# Patient Record
Sex: MALE | Race: WHITE | NOT HISPANIC OR LATINO | Employment: FULL TIME | URBAN - NONMETROPOLITAN AREA
[De-identification: names, ages, dates, MRNs, and addresses within clinical notes are randomized per-mention and may not be internally consistent; named-entity substitution may affect disease eponyms.]

---

## 2017-06-14 ENCOUNTER — HOSPITAL ENCOUNTER (EMERGENCY)
Facility: HOSPITAL | Age: 32
Discharge: HOME | End: 2017-06-15
Attending: EMERGENCY MEDICINE | Admitting: EMERGENCY MEDICINE

## 2017-06-14 DIAGNOSIS — K92.2 LOWER GI BLEEDING: Primary | ICD-10-CM

## 2017-06-14 PROCEDURE — 99282 EMERGENCY DEPT VISIT SF MDM: CPT

## 2017-06-15 VITALS
HEIGHT: 69 IN | RESPIRATION RATE: 18 BRPM | TEMPERATURE: 98 F | WEIGHT: 275 LBS | DIASTOLIC BLOOD PRESSURE: 88 MMHG | BODY MASS INDEX: 40.73 KG/M2 | OXYGEN SATURATION: 98 % | SYSTOLIC BLOOD PRESSURE: 148 MMHG | HEART RATE: 80 BPM

## 2017-06-15 NOTE — ED PROVIDER NOTES
Subjective   Patient is a 31 y.o. male presenting with GI bleeding.   GI Bleeding   Severity:  Mild  Onset quality:  Sudden  Duration:  2 days  Timing:  Intermittent  Progression:  Waxing and waning  Chronicity:  New (For the past 2 days this patient has had some several episodes with light red bright red blood on his toilet paper when he wiped.  There is no mixed blood in the stool.  There is no pain or hemorrhoids)  Associated symptoms: no abdominal pain, no chest pain, no diarrhea, no fever, no myalgias, no nausea, no rash, no shortness of breath, no vomiting and no wheezing        Review of Systems   Constitutional: Negative for activity change and fever.   HENT: Negative.  Negative for trouble swallowing.    Eyes: Negative for discharge.   Respiratory: Negative for shortness of breath, wheezing and stridor.    Cardiovascular: Negative for chest pain.   Gastrointestinal: Positive for anal bleeding (he has very light anal bleeding). Negative for abdominal pain, diarrhea, nausea and vomiting.   Genitourinary: Negative for difficulty urinating and flank pain.   Musculoskeletal: Negative for arthralgias and myalgias.   Skin: Negative for rash and wound.   Neurological: Negative for dizziness.   Psychiatric/Behavioral: Negative for agitation.   All other systems reviewed and are negative.      History reviewed. No pertinent past medical history.    Allergies   Allergen Reactions   • Erythromycin Rash       History reviewed. No pertinent surgical history.    History reviewed. No pertinent family history.    Social History     Social History   • Marital status:      Spouse name: N/A   • Number of children: N/A   • Years of education: N/A     Social History Main Topics   • Smoking status: Current Every Day Smoker     Packs/day: 1.00   • Smokeless tobacco: None   • Alcohol use No   • Drug use: No   • Sexual activity: Yes     Other Topics Concern   • None     Social History Narrative   • None           Objective    Physical Exam   Constitutional: He is oriented to person, place, and time. He appears well-developed and well-nourished.   HENT:   Head: Normocephalic.   Right Ear: External ear normal.   Left Ear: External ear normal.   Nose: Nose normal.   Mouth/Throat: Oropharynx is clear and moist.   Eyes: EOM are normal. Pupils are equal, round, and reactive to light.   Neck: Normal range of motion. Neck supple. No thyromegaly present.   Cardiovascular: Normal rate, regular rhythm, normal heart sounds and intact distal pulses.    Pulmonary/Chest: Effort normal and breath sounds normal. No respiratory distress. He has no wheezes. He has no rales.   Abdominal: Soft. He exhibits no distension. There is no tenderness. There is no rebound and no guarding.   Genitourinary: Rectal exam shows guaiac positive stool (examination of the anus shows no obvious hemorrhoids there is a small tag present.  Nontender Hemoccult is very weakly positive).   Musculoskeletal: Normal range of motion. He exhibits no edema or tenderness.   Neurological: He is alert and oriented to person, place, and time. He has normal reflexes. No cranial nerve deficit.   Skin: Skin is warm and dry. No rash noted.   Psychiatric: He has a normal mood and affect. His behavior is normal. Judgment and thought content normal.   Nursing note and vitals reviewed.      Procedures         ED Course  ED Course   Comment By Time   This patient appears relatively minor problem but she has to have it checked out to make sure there is no GI bleed through a colonoscopy.  He is given Dr. Carbajal's number and told how important it is to follow-up Suhas Mon MD 06/14 8847                  [facility]  Number of Diagnoses or Management Options  Lower GI bleeding:   Patient Progress  Patient progress: stable      Final diagnoses:   Lower GI bleeding            Suhas Mon MD  06/14/17 5629

## 2022-02-02 ENCOUNTER — TRANSCRIBE ORDERS (OUTPATIENT)
Dept: ADMINISTRATIVE | Facility: HOSPITAL | Age: 37
End: 2022-02-02

## 2022-02-02 ENCOUNTER — HOSPITAL ENCOUNTER (OUTPATIENT)
Dept: GENERAL RADIOLOGY | Facility: HOSPITAL | Age: 37
Discharge: HOME | End: 2022-02-02
Admitting: PHYSICIAN ASSISTANT

## 2022-02-02 DIAGNOSIS — M25.561 RIGHT KNEE PAIN, UNSPECIFIED CHRONICITY: Primary | ICD-10-CM

## 2022-02-02 DIAGNOSIS — M25.561 RIGHT KNEE PAIN, UNSPECIFIED CHRONICITY: ICD-10-CM

## 2022-02-02 PROCEDURE — 73562 X-RAY EXAM OF KNEE 3: CPT

## 2022-02-02 PROCEDURE — 73562 X-RAY EXAM OF KNEE 3: CPT | Performed by: RADIOLOGY

## 2022-03-01 ENCOUNTER — OFFICE VISIT (OUTPATIENT)
Dept: ORTHOPEDIC SURGERY | Facility: CLINIC | Age: 37
End: 2022-03-01

## 2022-03-01 ENCOUNTER — LAB (OUTPATIENT)
Dept: LAB | Facility: HOSPITAL | Age: 37
End: 2022-03-01

## 2022-03-01 VITALS
HEART RATE: 80 BPM | SYSTOLIC BLOOD PRESSURE: 163 MMHG | HEIGHT: 71 IN | BODY MASS INDEX: 38.08 KG/M2 | WEIGHT: 272 LBS | DIASTOLIC BLOOD PRESSURE: 102 MMHG

## 2022-03-01 DIAGNOSIS — M25.562 ACUTE BILATERAL KNEE PAIN: Primary | ICD-10-CM

## 2022-03-01 DIAGNOSIS — M25.462 KNEE EFFUSION, LEFT: ICD-10-CM

## 2022-03-01 DIAGNOSIS — M89.8X6 EXOSTOSIS OF LEFT TIBIA: ICD-10-CM

## 2022-03-01 DIAGNOSIS — M17.0 PRIMARY OSTEOARTHRITIS OF KNEES, BILATERAL: ICD-10-CM

## 2022-03-01 DIAGNOSIS — M25.561 ACUTE BILATERAL KNEE PAIN: Primary | ICD-10-CM

## 2022-03-01 DIAGNOSIS — M25.561 ACUTE BILATERAL KNEE PAIN: ICD-10-CM

## 2022-03-01 DIAGNOSIS — M25.562 ACUTE BILATERAL KNEE PAIN: ICD-10-CM

## 2022-03-01 PROBLEM — I10 PRIMARY HYPERTENSION: Chronic | Status: ACTIVE | Noted: 2022-03-01

## 2022-03-01 PROCEDURE — 80053 COMPREHEN METABOLIC PANEL: CPT

## 2022-03-01 PROCEDURE — 99204 OFFICE O/P NEW MOD 45 MIN: CPT | Performed by: FAMILY MEDICINE

## 2022-03-01 PROCEDURE — 84550 ASSAY OF BLOOD/URIC ACID: CPT

## 2022-03-01 PROCEDURE — 86140 C-REACTIVE PROTEIN: CPT

## 2022-03-01 PROCEDURE — 85652 RBC SED RATE AUTOMATED: CPT

## 2022-03-01 PROCEDURE — 85025 COMPLETE CBC W/AUTO DIFF WBC: CPT

## 2022-03-01 PROCEDURE — 36415 COLL VENOUS BLD VENIPUNCTURE: CPT

## 2022-03-01 RX ORDER — LISINOPRIL 10 MG/1
TABLET ORAL
COMMUNITY
Start: 2021-11-29

## 2022-03-01 RX ORDER — KETOROLAC TROMETHAMINE 10 MG/1
TABLET, FILM COATED ORAL
COMMUNITY
Start: 2022-02-07

## 2022-03-01 RX ORDER — PREDNISONE 20 MG/1
60 TABLET ORAL DAILY
Qty: 15 TABLET | Refills: 0 | Status: SHIPPED | OUTPATIENT
Start: 2022-03-01 | End: 2022-03-06

## 2022-03-01 RX ORDER — IBUPROFEN 800 MG/1
TABLET ORAL
COMMUNITY
Start: 2022-01-27

## 2022-03-01 NOTE — PROGRESS NOTES
New Patient Visit      Patient: Temo Campbell  YOB: 1985  Date of Encounter: 03/01/2022  PCP: Emilee Abrams PA-C  Referring Provider: No ref. provider found     Subjective   Temo Campbell is a 36 y.o. male who presents to the office today for evaluation of Pain, Edema, and Initial Evaluation of the Left Knee      Chief Complaint   Patient presents with   • Left Knee - Pain, Edema, Initial Evaluation       HPI     The patient reports bilateral knee pain the onset of which was approximately 16 days ago. He denies any inciting injury. He has swelling, pain and decreased mobility in his left knee. When his symptoms began the knee was very erythematous, warm to the touch and swollen, but his symptoms have resolved somewhat. Today he reports that the left knee feels heavy and denies any pain in the right. The pain is triggered by standing for long periods. He has not noticed any dietary triggers.  He does report eating red meat. He had been managing his pain and swelling with ibuprofen and or ketorolac. He states that he very seldom takes the ketorolac. He reports that he has had intermittent knee pain that travels between the knees for 2 years, but it would only last a couple days and resolve. The symptoms typically manifest in the winter. He denies any history of injury to the knee. He suspects there may be a family history of gout on his maternal side.     Patient Active Problem List   Diagnosis   • Primary hypertension       History reviewed. No pertinent past medical history.    History reviewed. No pertinent surgical history.    History reviewed. No pertinent family history.    Social History     Socioeconomic History   • Marital status: Single   Tobacco Use   • Smoking status: Former Smoker     Packs/day: 1.00   • Smokeless tobacco: Never Used   Vaping Use   • Vaping Use: Never used   Substance and Sexual Activity   • Alcohol use: No   • Drug use: No   • Sexual activity: Yes       Current Outpatient  "Medications   Medication Sig Dispense Refill   • ibuprofen (ADVIL,MOTRIN) 800 MG tablet      • ketorolac (TORADOL) 10 MG tablet TAKE 1 TABLET BY MOUTH EVERY 6 HOURS FOR 5 DAYS     • lisinopril (PRINIVIL,ZESTRIL) 10 MG tablet      • predniSONE (DELTASONE) 20 MG tablet Take 3 tablets by mouth Daily for 5 days. 15 tablet 0     No current facility-administered medications for this visit.       Allergies   Allergen Reactions   • Erythromycin Rash       Review of Systems   Constitutional: Positive for activity change. Negative for fever.   Respiratory: Negative for shortness of breath and wheezing.    Cardiovascular: Negative for chest pain.   Musculoskeletal: Positive for arthralgias, gait problem, joint swelling and myalgias.   Skin: Negative for color change and wound.   Neurological: Negative for weakness and numbness.       Visit Vitals  BP (!) 163/102   Pulse 80   Ht 180.3 cm (71\")   Wt 123 kg (272 lb)   BMI 37.94 kg/m²     36 y.o.male  Physical Exam  Vitals and nursing note reviewed.   Constitutional:       General: He is not in acute distress.     Appearance: Normal appearance.   Cardiovascular:      Pulses:           Posterior tibial pulses are 2+ on the right side and 2+ on the left side.   Pulmonary:      Effort: Pulmonary effort is normal. No respiratory distress.   Musculoskeletal:      Right knee: No effusion. Normal range of motion. No tenderness.      Instability Tests: Anterior drawer test negative. Posterior drawer test negative. Anterior Lachman test negative. Medial Dexter test negative and lateral Dexter test negative.      Left knee: Effusion present. Decreased range of motion. Tenderness present.      Instability Tests: Anterior drawer test negative. Posterior drawer test negative. Anterior Lachman test negative. Lateral Dexter test positive. Medial Dexter test negative.      Comments: Left knee:  1--2+ effusion.   Tender posterior joint line.   Negative varus valgus stress.   Flexion " limited to 95 degrees.   Strength intact.   Joint is not hot or erythematous    Right knee:  Negative varus valgus stress.    Skin:     General: Skin is warm and dry.      Findings: No erythema.   Neurological:      General: No focal deficit present.      Mental Status: He is alert.      Sensory: No sensory deficit.      Motor: No weakness.      Deep Tendon Reflexes: Reflexes are normal and symmetric.         Radiology Results:    XR Knee 3 View Bilateral  Narrative: EXAMINATION: XR KNEE 3 VIEW BILATERAL-      CLINICAL INDICATION: [id number]; M25.561-Pain in right knee        COMPARISON: None available.     PROCEDURE:  Three views of the right knee and 3 views of the left knee.     FINDINGS: There is an exostosis directed caudally off of the medial  aspect of the proximal tibia. No sclerosis.     Otherwise, there is joint space narrowing in the medial compartments of  both knees. Small spur off the lateral tibial spine.     No fracture or dislocation.     Impression: 1. Mild arthritic change.  2. Exostosis off of the medial aspect of the left tibia.      This report was finalized on 2/2/2022 10:26 AM by Dr. Dixon Cortez MD.         Assessment/Plan   Diagnoses and all orders for this visit:    1. Acute bilateral knee pain (Primary)  -     Uric Acid; Future  -     Sedimentation Rate; Future  -     C-reactive Protein; Future  -     Comprehensive Metabolic Panel; Future  -     CBC & Differential; Future  -     predniSONE (DELTASONE) 20 MG tablet; Take 3 tablets by mouth Daily for 5 days.  Dispense: 15 tablet; Refill: 0    2. Primary osteoarthritis of knees, bilateral  -     Uric Acid; Future  -     Sedimentation Rate; Future  -     C-reactive Protein; Future  -     Comprehensive Metabolic Panel; Future  -     CBC & Differential; Future  -     predniSONE (DELTASONE) 20 MG tablet; Take 3 tablets by mouth Daily for 5 days.  Dispense: 15 tablet; Refill: 0    3. Exostosis of left tibia  -     Uric Acid; Future  -      Sedimentation Rate; Future  -     C-reactive Protein; Future  -     Comprehensive Metabolic Panel; Future  -     CBC & Differential; Future  -     predniSONE (DELTASONE) 20 MG tablet; Take 3 tablets by mouth Daily for 5 days.  Dispense: 15 tablet; Refill: 0    4. Knee effusion, left  -     Uric Acid; Future  -     Sedimentation Rate; Future  -     C-reactive Protein; Future  -     Comprehensive Metabolic Panel; Future  -     CBC & Differential; Future  -     predniSONE (DELTASONE) 20 MG tablet; Take 3 tablets by mouth Daily for 5 days.  Dispense: 15 tablet; Refill: 0         MEDS ORDERED DURING VISIT:  New Medications Ordered This Visit   Medications   • predniSONE (DELTASONE) 20 MG tablet     Sig: Take 3 tablets by mouth Daily for 5 days.     Dispense:  15 tablet     Refill:  0     MEDICATION ISSUES:  Discussed medication options and treatment plan of prescribed medication as well as the risks, benefits, and side effects including potential falls, possible impaired driving and metabolic adversities among others. Patient is agreeable to call the office with any worsening of symptoms or onset of side effects. Patient is agreeable to call 911 or go to the nearest ER should he/she begin having SI/HI.     Discussion:  Patient will hold NSAIDs while on prednisone.  Only needs to take 1 NSAID at a time after that, preferably being ibuprofen and save the ketorolac for bad days.    Patient history of recurrent knee swelling and heat without any injury and positive hand family history are concerning for gout or pseudogout.  We will get blood work-up to investigate this although uric acid may have dropped back to normal as it has been 2 weeks since this flareup started and he has been taking NSAIDs intermittently.  May need to repeat labs at the next flareup.  Patient declines joint aspiration today in the office.  Follow-up in 2 weeks for reevaluation and to go over blood work and consider further intervention if not  improving.    Patient blood pressure is elevated because he forgot to take his medicine will take it when he gets home.         This document has been electronically signed by Jose Armando Bates DO   March 1, 2022 15:42 EST    Part of this note may be an electronic transcription/translation of spoken language to printed text using the Dragon Dictation System.    Transcribed from ambient dictation for Jose Armando Bates DO by Linette Crawford.  03/01/22   21:19 EST    Patient verbalized consent to the visit recording.  I have personally performed the services described in this document as transcribed by the above individual, and it is both accurate and complete.  Jose Armando Bates DO  3/3/2022  11:37 EST

## 2022-03-02 LAB
ALBUMIN SERPL-MCNC: 4.2 G/DL (ref 3.5–5.2)
ALBUMIN/GLOB SERPL: 1.3 G/DL
ALP SERPL-CCNC: 81 U/L (ref 39–117)
ALT SERPL W P-5'-P-CCNC: 20 U/L (ref 1–41)
ANION GAP SERPL CALCULATED.3IONS-SCNC: 11 MMOL/L (ref 5–15)
AST SERPL-CCNC: 16 U/L (ref 1–40)
BASOPHILS # BLD AUTO: 0.06 10*3/MM3 (ref 0–0.2)
BASOPHILS NFR BLD AUTO: 0.4 % (ref 0–1.5)
BILIRUB SERPL-MCNC: 0.3 MG/DL (ref 0–1.2)
BUN SERPL-MCNC: 13 MG/DL (ref 6–20)
BUN/CREAT SERPL: 10.6 (ref 7–25)
CALCIUM SPEC-SCNC: 9.9 MG/DL (ref 8.6–10.5)
CHLORIDE SERPL-SCNC: 101 MMOL/L (ref 98–107)
CO2 SERPL-SCNC: 27 MMOL/L (ref 22–29)
CREAT SERPL-MCNC: 1.23 MG/DL (ref 0.76–1.27)
CRP SERPL-MCNC: <0.3 MG/DL (ref 0–0.5)
DEPRECATED RDW RBC AUTO: 41.7 FL (ref 37–54)
EGFRCR SERPLBLD CKD-EPI 2021: 77.1 ML/MIN/1.73
EOSINOPHIL # BLD AUTO: 0.26 10*3/MM3 (ref 0–0.4)
EOSINOPHIL NFR BLD AUTO: 1.9 % (ref 0.3–6.2)
ERYTHROCYTE [DISTWIDTH] IN BLOOD BY AUTOMATED COUNT: 12.8 % (ref 12.3–15.4)
ERYTHROCYTE [SEDIMENTATION RATE] IN BLOOD: 21 MM/HR (ref 0–15)
GLOBULIN UR ELPH-MCNC: 3.2 GM/DL
GLUCOSE SERPL-MCNC: 81 MG/DL (ref 65–99)
HCT VFR BLD AUTO: 48.9 % (ref 37.5–51)
HGB BLD-MCNC: 16.5 G/DL (ref 13–17.7)
IMM GRANULOCYTES # BLD AUTO: 0.08 10*3/MM3 (ref 0–0.05)
IMM GRANULOCYTES NFR BLD AUTO: 0.6 % (ref 0–0.5)
LYMPHOCYTES # BLD AUTO: 3.16 10*3/MM3 (ref 0.7–3.1)
LYMPHOCYTES NFR BLD AUTO: 22.6 % (ref 19.6–45.3)
MCH RBC QN AUTO: 30.4 PG (ref 26.6–33)
MCHC RBC AUTO-ENTMCNC: 33.7 G/DL (ref 31.5–35.7)
MCV RBC AUTO: 90.2 FL (ref 79–97)
MONOCYTES # BLD AUTO: 0.96 10*3/MM3 (ref 0.1–0.9)
MONOCYTES NFR BLD AUTO: 6.9 % (ref 5–12)
NEUTROPHILS NFR BLD AUTO: 67.6 % (ref 42.7–76)
NEUTROPHILS NFR BLD AUTO: 9.46 10*3/MM3 (ref 1.7–7)
NRBC BLD AUTO-RTO: 0 /100 WBC (ref 0–0.2)
PLATELET # BLD AUTO: 278 10*3/MM3 (ref 140–450)
PMV BLD AUTO: 11.2 FL (ref 6–12)
POTASSIUM SERPL-SCNC: 4.3 MMOL/L (ref 3.5–5.2)
PROT SERPL-MCNC: 7.4 G/DL (ref 6–8.5)
RBC # BLD AUTO: 5.42 10*6/MM3 (ref 4.14–5.8)
SODIUM SERPL-SCNC: 139 MMOL/L (ref 136–145)
URATE SERPL-MCNC: 7.6 MG/DL (ref 3.4–7)
WBC NRBC COR # BLD: 13.98 10*3/MM3 (ref 3.4–10.8)

## 2022-03-03 ENCOUNTER — TELEPHONE (OUTPATIENT)
Dept: ORTHOPEDIC SURGERY | Facility: CLINIC | Age: 37
End: 2022-03-03

## 2022-03-03 NOTE — TELEPHONE ENCOUNTER
Spoke to patient. States knee is a little better. No fevers, heat or erythema. Started steroids today. States he was on steroids about a week ago from PCP.  Took a medrol dosepack and finished about 1 week ago.  Reviewed lab reports with patient and suspect that the steroids are likely the cause of his mildly elevated WBCs and that his knee swelling was likely a gout flareup.  Not suspicious of infection in the knee based on exam.  Discussed signs to watch for and patient will notify us if there are changes.  Will reevaluate at follow-up and consider repeating lab work which showed a white count was down.

## 2022-03-17 ENCOUNTER — OFFICE VISIT (OUTPATIENT)
Dept: ORTHOPEDIC SURGERY | Facility: CLINIC | Age: 37
End: 2022-03-17

## 2022-03-17 VITALS
HEIGHT: 71 IN | DIASTOLIC BLOOD PRESSURE: 92 MMHG | OXYGEN SATURATION: 98 % | WEIGHT: 272 LBS | SYSTOLIC BLOOD PRESSURE: 159 MMHG | HEART RATE: 86 BPM | BODY MASS INDEX: 38.08 KG/M2

## 2022-03-17 DIAGNOSIS — M25.562 ACUTE BILATERAL KNEE PAIN: Primary | ICD-10-CM

## 2022-03-17 DIAGNOSIS — D72.829 LEUKOCYTOSIS, UNSPECIFIED TYPE: ICD-10-CM

## 2022-03-17 DIAGNOSIS — M1A.0690 CHRONIC GOUT OF KNEE, UNSPECIFIED CAUSE, UNSPECIFIED LATERALITY: ICD-10-CM

## 2022-03-17 DIAGNOSIS — M17.0 PRIMARY OSTEOARTHRITIS OF KNEES, BILATERAL: ICD-10-CM

## 2022-03-17 DIAGNOSIS — M89.8X6 EXOSTOSIS OF LEFT TIBIA: ICD-10-CM

## 2022-03-17 DIAGNOSIS — M25.462 KNEE EFFUSION, LEFT: ICD-10-CM

## 2022-03-17 DIAGNOSIS — M25.561 ACUTE BILATERAL KNEE PAIN: Primary | ICD-10-CM

## 2022-03-17 PROCEDURE — 99214 OFFICE O/P EST MOD 30 MIN: CPT | Performed by: FAMILY MEDICINE

## 2022-03-17 RX ORDER — INDOMETHACIN 50 MG/1
50 CAPSULE ORAL EVERY 8 HOURS
Qty: 30 CAPSULE | Refills: 3 | Status: SHIPPED | OUTPATIENT
Start: 2022-03-17

## 2022-03-17 NOTE — PROGRESS NOTES
"Follow Up Visit      Patient: Temo Campbell  YOB: 1985  Date of Encounter: 03/17/2022  PCP: Emilee Abrams PA-C  Referring Provider: No ref. provider found     Subjective   Temo Campbell is a 36 y.o. male who presents to the office today for evaluation of Pain and Follow-up of the Left Knee      Chief Complaint   Patient presents with   • Left Knee - Pain, Follow-up       HPI   Patient presents for follow up for left knee pain.     Patient states his knee is feeling approximately 85 to 90 percent better than the previous visit. He was started on oral steroids during the previous visit. He reports improvement in his edema and range of motion. His white blood cell count has been elevated, but it may be contributed to another steroid that he was prescribed.     During the previous visit, his blood work showed that his uric acid was slightly elevated. The patient states that he has had a history of gout flare ups, but that this previous flare up was the most painful that he has ever experienced. He states that, on average, he gets 2 to 3 flare ups per year.     Patient Active Problem List   Diagnosis   • Primary hypertension       History reviewed. No pertinent past medical history.    Allergies   Allergen Reactions   • Erythromycin Rash       Review of Systems   Constitutional: Positive for activity change. Negative for fever.   Respiratory: Negative for shortness of breath and wheezing.    Cardiovascular: Negative for chest pain.   Musculoskeletal: Positive for arthralgias and myalgias.   Skin: Negative for color change and wound.   Neurological: Negative for weakness and numbness.       Visit Vitals  /92   Pulse 86   Ht 180.3 cm (71\")   Wt 123 kg (272 lb)   SpO2 98%   BMI 37.94 kg/m²     36 y.o.male  Physical Exam  Vitals and nursing note reviewed.   Constitutional:       General: He is not in acute distress.     Appearance: Normal appearance.   Cardiovascular:      Pulses:           Posterior " tibial pulses are 2+ on the right side and 2+ on the left side.   Pulmonary:      Effort: Pulmonary effort is normal. No respiratory distress.   Musculoskeletal:      Right knee: No effusion. Normal range of motion. No tenderness.      Instability Tests: Anterior drawer test negative. Posterior drawer test negative. Anterior Lachman test negative. Medial Dexter test negative and lateral Dexter test negative.      Left knee: Effusion present. Decreased range of motion. Tenderness present.      Instability Tests: Anterior drawer test negative. Posterior drawer test negative. Anterior Lachman test negative. Lateral Dexter test positive. Medial Dexter test negative.      Comments: Left knee:  trace effusion.   Tender posterior joint line improved.   Negative varus valgus stress.   Flexion improved but still mildly restricted  Strength intact.   Joint is not hot or erythematous    Right knee:  Negative varus valgus stress.    Skin:     General: Skin is warm and dry.      Findings: No erythema.   Neurological:      General: No focal deficit present.      Mental Status: He is alert.      Sensory: No sensory deficit.      Motor: No weakness.      Deep Tendon Reflexes: Reflexes are normal and symmetric.         Radiology Results:    No radiology results for the last 30 days.    CBC w/diff    CBC w/Diff 3/1/22   WBC 13.98 (A)   RBC 5.42   Hemoglobin 16.5   Hematocrit 48.9   MCV 90.2   MCH 30.4   MCHC 33.7   RDW 12.8   Platelets 278   Neutrophil Rel % 67.6   Immature Granulocyte Rel % 0.6 (A)   Lymphocyte Rel % 22.6   Monocyte Rel % 6.9   Eosinophil Rel % 1.9   Basophil Rel % 0.4   (A) Abnormal value            Lab Results   Component Value Date    GLUCOSE 81 03/01/2022    BUN 13 03/01/2022    CREATININE 1.23 03/01/2022    BCR 10.6 03/01/2022    K 4.3 03/01/2022    CO2 27.0 03/01/2022    CALCIUM 9.9 03/01/2022    ALBUMIN 4.20 03/01/2022    LABIL2 1.6 03/30/2014    AST 16 03/01/2022    ALT 20 03/01/2022     CRP < 0.30,  ESR 21, uric acid 7.6 H    Assessment/Plan   Diagnoses and all orders for this visit:    1. Acute bilateral knee pain (Primary)  -     indomethacin (INDOCIN) 50 MG capsule; Take 1 capsule by mouth Every 8 (Eight) Hours. TAKE Q8H FOR 3-5 DAYS AT FIRST SIGNS OF GOUT FLARE  Dispense: 30 capsule; Refill: 3  -     CBC & Differential; Future  -     Uric Acid; Future    2. Primary osteoarthritis of knees, bilateral  -     indomethacin (INDOCIN) 50 MG capsule; Take 1 capsule by mouth Every 8 (Eight) Hours. TAKE Q8H FOR 3-5 DAYS AT FIRST SIGNS OF GOUT FLARE  Dispense: 30 capsule; Refill: 3  -     CBC & Differential; Future  -     Uric Acid; Future    3. Exostosis of left tibia  -     indomethacin (INDOCIN) 50 MG capsule; Take 1 capsule by mouth Every 8 (Eight) Hours. TAKE Q8H FOR 3-5 DAYS AT FIRST SIGNS OF GOUT FLARE  Dispense: 30 capsule; Refill: 3  -     CBC & Differential; Future  -     Uric Acid; Future    4. Knee effusion, left  -     indomethacin (INDOCIN) 50 MG capsule; Take 1 capsule by mouth Every 8 (Eight) Hours. TAKE Q8H FOR 3-5 DAYS AT FIRST SIGNS OF GOUT FLARE  Dispense: 30 capsule; Refill: 3  -     CBC & Differential; Future  -     Uric Acid; Future    5. Chronic gout of knee, unspecified cause, unspecified laterality  -     indomethacin (INDOCIN) 50 MG capsule; Take 1 capsule by mouth Every 8 (Eight) Hours. TAKE Q8H FOR 3-5 DAYS AT FIRST SIGNS OF GOUT FLARE  Dispense: 30 capsule; Refill: 3  -     CBC & Differential; Future  -     Uric Acid; Future         MEDS ORDERED DURING VISIT:  New Medications Ordered This Visit   Medications   • indomethacin (INDOCIN) 50 MG capsule     Sig: Take 1 capsule by mouth Every 8 (Eight) Hours. TAKE Q8H FOR 3-5 DAYS AT FIRST SIGNS OF GOUT FLARE     Dispense:  30 capsule     Refill:  3     MEDICATION ISSUES:  Discussed medication options and treatment plan of prescribed medication as well as the risks, benefits, and side effects including potential falls, possible impaired  driving and metabolic adversities among others. Patient is agreeable to call the office with any worsening of symptoms or onset of side effects. Patient is agreeable to call 911 or go to the nearest ER should he/she begin having SI/HI.     Discussion: Patient does appear to have had a gout flare up in his left knee. Uric acid was still slightly elevated at the time of blood work, as well as a mildly elevated white blood count, which was likely from previous steroids. I would like to recheck both of these in 1 month to see if patient is maintaining these or if they resolve. If uric acid continues to be elevated without flare, we will consider suppressive therapy. If uric acid is normal, we will continue to only treat flare ups for the time being. Gave patient indomethacin to take at onset of new flares. Patient is doing well after the prednisone burst we gave him and is working out and doing exercises for his knee, which he says is also helping. Follow-up in 1 month.    Reviewed lab results with patient             This document has been electronically signed by Jose Armando Bates DO   March 17, 2022 16:57 EDT    Part of this note may be an electronic transcription/translation of spoken language to printed text using the Dragon Dictation System.    Transcribed from ambient dictation for Jose Armando Bates DO by Coreen Inman.  03/18/22   11:42 EDT    Patient verbalized consent to the visit recording.  I have personally performed the services described in this document as transcribed by the above individual, and it is both accurate and complete.  Jose Armando Bates DO  3/30/2022  10:19 EDT

## 2022-03-28 ENCOUNTER — TELEPHONE (OUTPATIENT)
Dept: ORTHOPEDIC SURGERY | Facility: CLINIC | Age: 37
End: 2022-03-28

## 2022-03-28 NOTE — TELEPHONE ENCOUNTER
Spoke with patients's wife, Evette, she said that when they called the pharmacy they didn't know anything about the order. I spoke with the pharmacy and was told that it is on back order. He told me to call around 4/15 to see if it has come in. I called the patient's wife, no answer, LVM. I mentioned it being on back order and for her to call me back. Left the number for the [facility] office.

## 2022-10-25 ENCOUNTER — TRANSCRIBE ORDERS (OUTPATIENT)
Dept: ADMINISTRATIVE | Facility: HOSPITAL | Age: 37
End: 2022-10-25

## 2022-10-25 DIAGNOSIS — R10.11 ABDOMINAL PAIN, RIGHT UPPER QUADRANT: Primary | ICD-10-CM

## 2022-11-02 ENCOUNTER — APPOINTMENT (OUTPATIENT)
Dept: ULTRASOUND IMAGING | Facility: HOSPITAL | Age: 37
End: 2022-11-02

## 2022-11-09 ENCOUNTER — HOSPITAL ENCOUNTER (OUTPATIENT)
Dept: ULTRASOUND IMAGING | Facility: HOSPITAL | Age: 37
Discharge: HOME | End: 2022-11-09
Admitting: PHYSICIAN ASSISTANT

## 2022-11-09 DIAGNOSIS — R10.11 ABDOMINAL PAIN, RIGHT UPPER QUADRANT: ICD-10-CM

## 2022-11-09 PROCEDURE — 76705 ECHO EXAM OF ABDOMEN: CPT | Performed by: RADIOLOGY

## 2022-11-09 PROCEDURE — 76705 ECHO EXAM OF ABDOMEN: CPT

## 2023-10-22 ENCOUNTER — HOSPITAL ENCOUNTER (EMERGENCY)
Facility: HOSPITAL | Age: 38
Discharge: HOME | End: 2023-10-22
Attending: EMERGENCY MEDICINE | Admitting: EMERGENCY MEDICINE
Payer: COMMERCIAL

## 2023-10-22 VITALS
SYSTOLIC BLOOD PRESSURE: 177 MMHG | DIASTOLIC BLOOD PRESSURE: 118 MMHG | BODY MASS INDEX: 40.04 KG/M2 | WEIGHT: 286 LBS | OXYGEN SATURATION: 96 % | HEIGHT: 71 IN | HEART RATE: 73 BPM | RESPIRATION RATE: 18 BRPM | TEMPERATURE: 97.6 F

## 2023-10-22 DIAGNOSIS — S61.211A LACERATION OF LEFT INDEX FINGER WITHOUT FOREIGN BODY WITHOUT DAMAGE TO NAIL, INITIAL ENCOUNTER: Primary | ICD-10-CM

## 2023-10-22 PROCEDURE — 99282 EMERGENCY DEPT VISIT SF MDM: CPT

## 2023-10-22 NOTE — Clinical Note
[facility] EMERGENCY DEPARTMENT  [address] [address]  Phone: [phone]    Temo Campbell was seen and treated in our emergency department on 10/22/2023.  He may return to work on 10/24/2023.         Thank you for choosing [facility].    Kameron Jimenez II, PA

## 2023-10-23 NOTE — ED PROVIDER NOTES
Subjective     History provided by:  Patient  Laceration  Location:  Finger  Finger laceration location:  L index finger  Length:  2.5  Depth:  Cutaneous  Laceration mechanism:  Knife (was carving pumpkin)  Pain details:     Quality:  Aching    Severity:  Mild  Tetanus status:  Up to date  Associated symptoms: no fever        Review of Systems   Constitutional: Negative.  Negative for fever.   HENT: Negative.     Respiratory: Negative.     Cardiovascular: Negative.  Negative for chest pain.   Gastrointestinal: Negative.  Negative for abdominal pain.   Endocrine: Negative.    Genitourinary: Negative.  Negative for dysuria.   Skin:  Positive for wound.   Neurological: Negative.    Psychiatric/Behavioral: Negative.     All other systems reviewed and are negative.      No past medical history on file.    Allergies   Allergen Reactions    Erythromycin Rash       No past surgical history on file.    No family history on file.    Social History     Socioeconomic History    Marital status: Single   Tobacco Use    Smoking status: Former     Packs/day: 1     Types: Cigarettes    Smokeless tobacco: Never   Vaping Use    Vaping Use: Never used   Substance and Sexual Activity    Alcohol use: No    Drug use: No    Sexual activity: Yes           Objective   Physical Exam  Vitals and nursing note reviewed.   Constitutional:       General: He is not in acute distress.     Appearance: He is well-developed. He is not diaphoretic.   HENT:      Head: Normocephalic and atraumatic.      Right Ear: External ear normal.      Left Ear: External ear normal.      Nose: Nose normal.   Eyes:      Conjunctiva/sclera: Conjunctivae normal.   Neck:      Vascular: No JVD.      Trachea: No tracheal deviation.   Cardiovascular:      Rate and Rhythm: Normal rate.      Heart sounds: No murmur heard.  Pulmonary:      Effort: Pulmonary effort is normal. No respiratory distress.      Breath sounds: No wheezing.   Abdominal:      Palpations: Abdomen is soft.       Tenderness: There is no abdominal tenderness.   Musculoskeletal:         General: No deformity. Normal range of motion.      Cervical back: Normal range of motion and neck supple.   Skin:     General: Skin is warm and dry.      Coloration: Skin is not pale.      Findings: No erythema or rash.      Comments: 2.5 cm laceration to the fat pad of the left index finger.  Bleeding is controlled at this time.   Neurological:      Mental Status: He is alert and oriented to person, place, and time.      Cranial Nerves: No cranial nerve deficit.   Psychiatric:         Behavior: Behavior normal.         Thought Content: Thought content normal.         Laceration Repair    Date/Time: 10/22/2023 10:52 PM    Performed by: Kameron Jimenez II, PA  Authorized by: Tejinder Stewart MD    Consent:     Consent obtained:  Verbal    Consent given by:  Patient  Universal protocol:     Patient identity confirmed:  Verbally with patient  Anesthesia:     Anesthesia method:  None  Laceration details:     Location:  Finger    Finger location:  L index finger    Length (cm):  2.5  Pre-procedure details:     Preparation:  Patient was prepped and draped in usual sterile fashion  Exploration:     Contaminated: no    Treatment:     Area cleansed with:  Soap and water    Amount of cleaning:  Standard  Skin repair:     Repair method:  Tissue adhesive  Repair type:     Repair type:  Simple  Post-procedure details:     Dressing:  Bulky dressing    Procedure completion:  Tolerated well, no immediate complications             ED Course                                           Medical Decision Making  38-year-old with accidental laceration of the fat pad of the left index finger.    Problems Addressed:  Laceration of left index finger without foreign body without damage to nail, initial encounter: acute illness or injury     Details: Wound at this point time is pretty much stopped bleeding.  Does not need any type of sutures.  Repaired with wound  adhesive.  Bulky dressing applied.  Outpatient follow-up needed.        Final diagnoses:   Laceration of left index finger without foreign body without damage to nail, initial encounter       ED Disposition  ED Disposition       ED Disposition   Discharge    Condition   Stable    Comment   --               Emilee Abrams PA-C  [address]  [address] [address]  [phone]    Schedule an appointment as soon as possible for a visit            Medication List      No changes were made to your prescriptions during this visit.            Kameron Jimenez II, PA  10/22/23 6603

## 2023-10-23 NOTE — ED TRIAGE NOTES
MEDICAL SCREENING:    Reason for Visit: Finger Laceration    Patient initially seen in triage.  The patient was advised further evaluation and diagnostic testing will be needed, some of the treatment and testing will be initiated in the lobby in order to begin the process.  The patient will be returned to the waiting area for the time being and possibly be re-assessed by a subsequent ED provider.  The patient will be brought back to the treatment area in as timely manner as possible.   
There are no Wet Read(s) to document.

## 2024-11-11 ENCOUNTER — HOSPITAL ENCOUNTER (EMERGENCY)
Facility: HOSPITAL | Age: 39
Discharge: HOME | End: 2024-11-11
Attending: STUDENT IN AN ORGANIZED HEALTH CARE EDUCATION/TRAINING PROGRAM | Admitting: STUDENT IN AN ORGANIZED HEALTH CARE EDUCATION/TRAINING PROGRAM
Payer: COMMERCIAL

## 2024-11-11 VITALS
DIASTOLIC BLOOD PRESSURE: 117 MMHG | BODY MASS INDEX: 39.2 KG/M2 | RESPIRATION RATE: 16 BRPM | HEIGHT: 71 IN | SYSTOLIC BLOOD PRESSURE: 184 MMHG | OXYGEN SATURATION: 98 % | HEART RATE: 79 BPM | WEIGHT: 280 LBS | TEMPERATURE: 97.8 F

## 2024-11-11 DIAGNOSIS — K02.9 DENTAL CARIES: Primary | ICD-10-CM

## 2024-11-11 PROCEDURE — 99283 EMERGENCY DEPT VISIT LOW MDM: CPT

## 2024-11-11 PROCEDURE — 96372 THER/PROPH/DIAG INJ SC/IM: CPT

## 2024-11-11 PROCEDURE — 25010000002 KETOROLAC TROMETHAMINE PER 15 MG: Performed by: STUDENT IN AN ORGANIZED HEALTH CARE EDUCATION/TRAINING PROGRAM

## 2024-11-11 PROCEDURE — 25010000002 DEXAMETHASONE SODIUM PHOSPHATE 10 MG/ML SOLUTION: Performed by: STUDENT IN AN ORGANIZED HEALTH CARE EDUCATION/TRAINING PROGRAM

## 2024-11-11 RX ORDER — DEXAMETHASONE SODIUM PHOSPHATE 10 MG/ML
5 INJECTION, SOLUTION INTRAMUSCULAR; INTRAVENOUS ONCE
Status: COMPLETED | OUTPATIENT
Start: 2024-11-11 | End: 2024-11-11

## 2024-11-11 RX ORDER — KETOROLAC TROMETHAMINE 30 MG/ML
30 INJECTION, SOLUTION INTRAMUSCULAR; INTRAVENOUS ONCE
Status: COMPLETED | OUTPATIENT
Start: 2024-11-11 | End: 2024-11-11

## 2024-11-11 RX ORDER — CLONIDINE HYDROCHLORIDE 0.1 MG/1
0.1 TABLET ORAL ONCE
Status: COMPLETED | OUTPATIENT
Start: 2024-11-11 | End: 2024-11-11

## 2024-11-11 RX ADMIN — DEXAMETHASONE SODIUM PHOSPHATE 5 MG: 10 INJECTION INTRAMUSCULAR; INTRAVENOUS at 21:48

## 2024-11-11 RX ADMIN — CLONIDINE HYDROCHLORIDE 0.1 MG: 0.1 TABLET ORAL at 23:11

## 2024-11-11 RX ADMIN — KETOROLAC TROMETHAMINE 30 MG: 60 INJECTION, SOLUTION INTRAMUSCULAR at 21:48

## 2024-11-11 RX ADMIN — CLONIDINE HYDROCHLORIDE 0.1 MG: 0.1 TABLET ORAL at 22:14

## 2024-11-11 RX ADMIN — AMOXICILLIN AND CLAVULANATE POTASSIUM 1 TABLET: 875; 125 TABLET, FILM COATED ORAL at 21:48

## 2024-11-11 RX ADMIN — BENZOCAINE: 200 LIQUID DENTAL; ORAL; PERIODONTAL at 21:48

## 2024-11-12 NOTE — ED PROVIDER NOTES
Subjective   History of Present Illness  39-year-old male with a past medical history of hypertension presents to the ER with a primary complaint of diffuse right lower molar dental pain.  Symptoms have been present for the past 1 to 2 days.  Confirmed pain with mastication.  No fever.  No chills.  No headache or meningismal symptoms.  No obvious alleviating factors.  Vitals note hypertension.  Otherwise vital stable.  Afebrile      Review of Systems   HENT:  Positive for dental problem.    All other systems reviewed and are negative.      No past medical history on file.    Allergies   Allergen Reactions    Erythromycin Rash       No past surgical history on file.    No family history on file.    Social History     Socioeconomic History    Marital status:    Tobacco Use    Smoking status: Former     Current packs/day: 1.00     Types: Cigarettes    Smokeless tobacco: Never   Vaping Use    Vaping status: Never Used   Substance and Sexual Activity    Alcohol use: No    Drug use: No    Sexual activity: Yes           Objective   Physical Exam  Constitutional:       General: He is not in acute distress.     Appearance: He is well-developed. He is not ill-appearing.   HENT:      Head: Normocephalic and atraumatic.      Mouth/Throat:      Dentition: Abnormal dentition. Dental tenderness and dental caries present.        Comments: Multiple abnormal dental findings in the right lower molar region.  Concerns for dental caries and broken teeth.  Eyes:      Extraocular Movements: Extraocular movements intact.      Pupils: Pupils are equal, round, and reactive to light.   Neck:      Vascular: No JVD.   Cardiovascular:      Rate and Rhythm: Normal rate and regular rhythm.      Heart sounds: Normal heart sounds. No murmur heard.  Pulmonary:      Effort: No tachypnea, accessory muscle usage or respiratory distress.      Breath sounds: Normal breath sounds. No stridor. No decreased breath sounds, wheezing, rhonchi or rales.    Chest:      Chest wall: No deformity, tenderness or crepitus.   Abdominal:      General: Bowel sounds are normal.      Palpations: Abdomen is soft.      Tenderness: There is no abdominal tenderness. There is no guarding or rebound.   Musculoskeletal:         General: Normal range of motion.      Cervical back: Normal range of motion and neck supple.      Right lower leg: No tenderness. No edema.      Left lower leg: No tenderness. No edema.   Lymphadenopathy:      Cervical: No cervical adenopathy.   Skin:     General: Skin is warm and dry.      Coloration: Skin is not cyanotic.      Findings: No ecchymosis or erythema.   Neurological:      General: No focal deficit present.      Mental Status: He is alert and oriented to person, place, and time.      Cranial Nerves: No cranial nerve deficit.      Motor: No weakness.   Psychiatric:         Mood and Affect: Mood normal. Mood is not anxious.         Behavior: Behavior normal. Behavior is not agitated.         Procedures           ED Course                    No data recorded                             Medical Decision Making  Patient received Toradol, Decadron, and oral lidocaine with cotton balls.  Patient also started on Augmentin.  Patient did have elevated blood pressure readings without symptoms like chest pain and shortness of breath or headache during ER course.  Patient did receive clonidine 0.1 mg x 1.  Blood pressure improved.  Patient remains asymptomatic.  Patient requested to be discharged home due to his early work schedule.  Work up and results were discussed throughly with the patient.  The patient will be discharged for further monitoring and management with their PCP.  Red flags, warning signs, worsening symptoms, and when to return to the ER discussed with and understood by the patient.  Patient will follow up with their PCP in a timely manner.  Vitals stable at discharge.    Please follow up with your primary care physician in the next 1-3 days.  If this is not possible, please return to the ED for re-evaluation.    It is IMPORTANT to see your DOCTOR OR PRIMARY CARE PROVIDER. Emergency Care may be incomplete without proper follow-up.  Your evaluation in the emergency department is focused on a specific clinical complaint, at a given moment in time.  Symptoms sometimes change or new symptoms might arise after you leave the Emergency Department. It is important that you call your doctor if you become worse in any way, or promptly return to the Emergency Department should any new, or worsening/changing symptom occur.  You are strongly urged to follow-up with your physician to assure complete and thorough care. PLEASE CALL YOUR DOCTORS OFFICE TODAY, INFORM THEM THAT YOU WERE SEEN IN THE EMERGENCY DEPARTMENT, AND THAT YOU NEED TO BE SEEN IMMEDIATELY FOR CLOSE FOLLOW UP.  If you do not have a primary care doctor we encourage you to proactively seek a local physician for close follow up.  Consider local clinics, free or sliding scale clinics, local [facility].  You can always return to the Emergency Department if you are having difficulty coordinating close follow up.  If medications were prescribed you should fill them at your local pharmacy immediately and take only as prescribed.  Bring your new medications to your doctors follow up visit to discuss any changes that would be necessary. RETURN TO THE EMERGENCY DEPARTMENT IMMEDIATELY FOR ANY NEW OR WORSENING SYMPTOMS.    ADDITIONAL DISCHARGE INSTRUCTIONS:     - If you received IV contrast today with a CT or MRI please stay well hydrated for the next 48-72 hours to protect your kidneys.    - If you have been prescribed an antibiotic, taking an over the counter probiotic may help with gastrointestinal side effects and antibiotic associated diarrhea.    - Return to the emergency department or seek immediate medical care if any of the following occur:           - Symptoms do not improve in 8-12 hours. If this  occurs, please return to the emergency department for re-evaluation or your symptoms or repeat abdominal examination         - Symptoms worsen at any time.         - If you are unable to follow up with a medical provider as instructed.         - You develop any worrisome symptoms such as fever, chills, uncontrolled pain, change or worsening of your pain symptoms, intractable vomiting, uncontrolled diarrhea, blood in your stool, dark/tarry stool, new neurological symptoms etc.    If you have been prescribed a narcotic pain medication, please take a stool softener to prevent constipation.     During your ED visit, you may have been given narcotics (such as morphine, dilaudid, percocet, vicodin) or sedatives (such as ativan, versed). These medications can impair your reflexes so, DO NOT DRIVE or USE ANY MACHINERY for at least 6 hours if you have been given these medications.    REMINDER FOR METFORMIN USERS: If you are using metformin (also known as Glucophage) and if you received a CT scan in which you received IV contrast, you must hold your metformin for a total of 72 hrs.  This will prevent any adverse interactions between the two agents.             Risk  Prescription drug management.        Final diagnoses:   Dental caries       ED Disposition  ED Disposition       ED Disposition   Discharge    Condition   Stable    Comment   --               No follow-up provider specified.       Medication List        New Prescriptions      amoxicillin-clavulanate 875-125 MG per tablet  Commonly known as: AUGMENTIN  Take 1 tablet by mouth 2 (Two) Times a Day.               Where to Get Your Medications        Information about where to get these medications is not yet available    Ask your nurse or doctor about these medications  amoxicillin-clavulanate 875-125 MG per tablet            Alexis Steele,   11/11/24 6043